# Patient Record
Sex: FEMALE | Race: WHITE
[De-identification: names, ages, dates, MRNs, and addresses within clinical notes are randomized per-mention and may not be internally consistent; named-entity substitution may affect disease eponyms.]

---

## 2019-11-18 ENCOUNTER — HOSPITAL ENCOUNTER (EMERGENCY)
Dept: HOSPITAL 97 - ER | Age: 34
Discharge: HOME | End: 2019-11-18
Payer: COMMERCIAL

## 2019-11-18 VITALS — OXYGEN SATURATION: 96 % | DIASTOLIC BLOOD PRESSURE: 73 MMHG | SYSTOLIC BLOOD PRESSURE: 124 MMHG

## 2019-11-18 VITALS — TEMPERATURE: 99.1 F

## 2019-11-18 DIAGNOSIS — N30.00: Primary | ICD-10-CM

## 2019-11-18 LAB
ALBUMIN SERPL BCP-MCNC: 3.8 G/DL (ref 3.4–5)
ALP SERPL-CCNC: 81 U/L (ref 45–117)
ALT SERPL W P-5'-P-CCNC: 18 U/L (ref 12–78)
AST SERPL W P-5'-P-CCNC: 9 U/L (ref 15–37)
BUN BLD-MCNC: 16 MG/DL (ref 7–18)
GIANT PLATELETS BLD QL SMEAR: (no result)
GLUCOSE SERPLBLD-MCNC: 100 MG/DL (ref 74–106)
HCT VFR BLD CALC: 41.8 % (ref 36–45)
LIPASE SERPL-CCNC: 67 U/L (ref 73–393)
LYMPHOCYTES # SPEC AUTO: 0.4 K/UL (ref 0.7–4.9)
MORPHOLOGY BLD-IMP: (no result)
PMV BLD: 10.3 FL (ref 7.6–11.3)
POTASSIUM SERPL-SCNC: 3.8 MMOL/L (ref 3.5–5.1)
RBC # BLD: 4.68 M/UL (ref 3.86–4.86)
UA COMPLETE W REFLEX CULTURE PNL UR: (no result)

## 2019-11-18 PROCEDURE — 83690 ASSAY OF LIPASE: CPT

## 2019-11-18 PROCEDURE — 81015 MICROSCOPIC EXAM OF URINE: CPT

## 2019-11-18 PROCEDURE — 85025 COMPLETE CBC W/AUTO DIFF WBC: CPT

## 2019-11-18 PROCEDURE — 81025 URINE PREGNANCY TEST: CPT

## 2019-11-18 PROCEDURE — 80053 COMPREHEN METABOLIC PANEL: CPT

## 2019-11-18 PROCEDURE — 76705 ECHO EXAM OF ABDOMEN: CPT

## 2019-11-18 PROCEDURE — 81003 URINALYSIS AUTO W/O SCOPE: CPT

## 2019-11-18 PROCEDURE — 36415 COLL VENOUS BLD VENIPUNCTURE: CPT

## 2019-11-18 PROCEDURE — 99284 EMERGENCY DEPT VISIT MOD MDM: CPT

## 2019-11-18 PROCEDURE — 87086 URINE CULTURE/COLONY COUNT: CPT

## 2019-11-18 PROCEDURE — 87088 URINE BACTERIA CULTURE: CPT

## 2019-11-18 NOTE — EDPHYS
Physician Documentation                                                                           

 [facility]                                                                 

Name: Elisha Aranda                                                                               

Age: 34 yrs                                                                                       

Sex: Female                                                                                       

: 1985                                                                                   

MRN: [id number]                                                                                   

Arrival Date: 2019                                                                          

Time: 10:33                                                                                       

Account#: [id number]                                                                            

Bed 8                                                                                             

Private MD:                                                                                       

ED Physician Jarret Marcelo                                                                      

HPI:                                                                                              

                                                                                             

11:07 This 34 yrs old  Female presents to ER via Ambulatory with complaints of       ps1 

      Abdominal Pain.                                                                             

11:07 Onset was this morning and associated with nausea and vomiting. Had loose stools but    ps1 

      does not attest to diarrhea. States her pain is localized to epigastrium and right          

      upper quadrant. Never had pain like this before. Does not associate with food. No           

      fever. Hx of abdominal surgery in past () remotely. Tolerating PO. .               

                                                                                                  

OB/GYN:                                                                                           

10:44 LMP N/A - Birth control method                                                          iw  

                                                                                                  

Historical:                                                                                       

- Allergies:                                                                                      

10:43 No Known Allergies;                                                                     tw2 

- Home Meds:                                                                                      

10:44 fluoxetine 10 mg Oral tab once daily [Active];                                          iw  

- PMHx:                                                                                           

10:43 None;                                                                                   tw2 

- PSHx:                                                                                           

10:43 ;                                                                              tw2 

                                                                                                  

- Immunization history:: Adult Immunizations.                                                     

- Social history:: Smoking status: .                                                              

- Ebola Screening: : Patient denies travel to an Ebola-affected area in the 21 days               

  before illness onset.                                                                           

                                                                                                  

                                                                                                  

ROS:                                                                                              

11:07 Constitutional: Negative for fever, chills, and weight loss, Eyes: Negative for injury, ps1 

      pain, redness, and discharge, Cardiovascular: Negative for chest pain, palpitations,        

      and edema, Respiratory: Negative for shortness of breath, cough, wheezing, and              

      pleuritic chest pain, MS/Extremity: Negative for injury and deformity, Skin: Negative       

      for injury, rash, and discoloration, Neuro: Negative for headache, weakness, numbness,      

      tingling, and seizure.                                                                      

11:07 Abdomen/GI: Positive for abdominal pain, nausea.                                            

                                                                                                  

Exam:                                                                                             

11:07 Constitutional:  This is a well developed, well nourished patient who is awake, alert,  ps1 

      and in no acute distress. Head/Face:  Normocephalic, atraumatic. Eyes:  Pupils equal        

      round and reactive to light, extra-ocular motions intact.  Lids and lashes normal.          

      Conjunctiva and sclera are non-icteric and not injected. Chest/axilla:  Normal chest        

      wall appearance and motion.  Nontender with no deformity.  No lesions are appreciated.      

      Cardiovascular:  Regular rate and rhythm.  No gallops, murmurs, or rubs.  Normal PMI,       

      no JVD.  No pulse deficits. Respiratory:  Lungs have equal breath sounds bilaterally,       

      clear to auscultation and percussion.  No rales, rhonchi or wheezes noted.  No              

      increased work of breathing, no retractions or nasal flaring. MS/ Extremity:  Pulses        

      equal, no cyanosis.  Neurovascular intact.  Full, normal range of motion. Neuro:  Awake     

      and alert, GCS 15, oriented to person, place, time, and situation.  Cranial nerves          

      II-XII grossly intact. Sensory grossly intact. Psych:  Awake, alert, with orientation       

      to person, place and time.  Behavior, mood, and affect are within normal limits.            

11:07 Abdomen/GI: Inspection: abdomen appears normal, Bowel sounds: normal, Palpation: soft,      

      mild abdominal tenderness, in the epigastric area and right upper quadrant, Indicators:     

      McBurney's point is not tender, Kent's sign is negative, Rovsing's sign is negative.      

                                                                                                  

Vital Signs:                                                                                      

10:44  / 85; Pulse 88; Resp 16; Temp 99.1(O); Pulse Ox 98% on R/A; Weight 83.01 kg;     iw  

      Height 5 ft. 2 in. (157.48 cm); Pain 6/10;                                                  

12:22  / 73; Pulse 88; Resp 17; Pulse Ox 96% on R/A;                                    tw2 

10:44 Body Mass Index 33.47 (83.01 kg, 157.48 cm)                                             iw  

                                                                                                  

MDM:                                                                                              

11:05 Patient medically screened.                                                             ps1 

12:22 Differential diagnosis: appendicitis, bowel obstruction, cholecystitis, Cholelithiasis, ps1 

      gastritis, urinary tract infection. Data reviewed: vital signs, nurses notes, lab test      

      result(s), radiologic studies, ultrasound. Counseling: I had a detailed discussion with     

      the patient and/or guardian regarding: the historical points, exam findings, and any        

      diagnostic results supporting the discharge/admit diagnosis, lab results, radiology         

      results, the need for outpatient follow up, to return to the emergency department if        

      symptoms worsen or persist or if there are any questions or concerns that arise at home.    

                                                                                                  

                                                                                             

10:58 Order name: Urine Dipstick--Ancillary (enter results); Complete Time: 11:31             bd  

                                                                                             

10:58 Order name: Urine Pregnancy--Ancillary (enter results); Complete Time: 11:31            bd  

                                                                                             

11:06 Order name: CBC with Diff                                                               ps1 

                                                                                             

12:21 Interpretation: Abnormal: DAVE% 91.0.                                                    ps1 

                                                                                             

11:06 Order name: CMP; Complete Time: 11:58                                                   ps1 

                                                                                             

11:06 Order name: Lipase; Complete Time: 11:58                                                [facility] 

                                                                                             

11:31 Order name: Urine Microscopic Only; Complete Time: 12:20                                ps1 

                                                                                             

12:20 Interpretation: Abnormal: UWBC 10-20; UBACT 20-50.                                      ps1 

                                                                                             

10:53 Order name: Urine Dipstick-Ancillary (obtain specimen); Complete Time: 10:53            tw2 

                                                                                             

10:54 Order name: Urine Pregnancy Test (obtain specimen); Complete Time: 10:54                tw2 

                                                                                             

11:06 Order name: US Abdomen Limited; Complete Time: 12:20                                    [facility] 

                                                                                             

11:11 Order name: IV Start; Complete Time: 11:30                                              tw2 

                                                                                             

12:03 Order name: Urine Culture                                                               EDMS

                                                                                                  

Administered Medications:                                                                         

No medications were administered                                                                  

                                                                                                  

                                                                                                  

Disposition:                                                                                      

19 12:24 Discharged to Home. Impression: Acute cystitis without hematuria, Unspecified      

  abdominal pain.                                                                                 

- Condition is Stable.                                                                            

- Discharge Instructions: Abdominal Pain, Adult, Urinary Tract Infection, Adult.                  

- Prescriptions for Keflex 500 mg Oral Capsule - take 1 capsule by ORAL route every 8             

  hours for 5 days; 15 capsule. Zofran 4 mg Oral Tablet - take 1 tablet by ORAL route             

  every 12 hours As needed; 20 tablet.                                                            

- Work release form, Family Work Release, Medication Reconciliation Form, Thank You               

  Letter, Antibiotic Education, Prescription Opioid Use form.                                     

- Follow up: Private Physician; When: As needed; Reason: Further diagnostic work-up,              

  Recheck today's complaints, Continuance of care, Re-evaluation by your physician.               

  Follow up: Emergency Department; When: As needed; Reason: Fever > 102 F, Worsening of           

  condition.                                                                                      

- Problem is new.                                                                                 

- Symptoms have improved.                                                                         

                                                                                                  

                                                                                                  

                                                                                                  

Signatures:                                                                                       

Dispatcher [employer]                           EDMS                                                 

Virginia Aguirre RN                     RN   iw                                                   

Rebekah Green RN                          RN   tw2                                                  

Jarret Marcelo MD MD   ps1                                                  

                                                                                                  

Corrections: (The following items were deleted from the chart)                                    

10:44 10:43 Home Meds: None; tw2                                                              iw  

12:20 12:20 Abnormal: UWBC 10-20. ps1                                                         ps1 

12:33 12:24 2019 12:24 Discharged to Home. Impression: Acute cystitis without           tw2 

      hematuria; Unspecified abdominal pain. Condition is Stable. Forms are Work release          

      form, Family Work Release, Medication Reconciliation Form, Thank You Letter, Antibiotic     

      Education, Prescription Opioid Use. Follow up: Private Physician; When: As needed;          

      Reason: Further diagnostic work-up, Recheck today's complaints, Continuance of care,        

      Re-evaluation by your physician. Follow up: Emergency Department; When: As needed;          

      Reason: Fever > 102 F, Worsening of condition. Problem is new. Symptoms have improved.      

      ps1                                                                                         

                                                                                                  

**************************************************************************************************

## 2019-11-18 NOTE — RAD REPORT
EXAM DESCRIPTION:  US - Abdomen Exam Limited - 11/18/2019 11:57 am

 

CLINICAL HISTORY:  RUQ pain

 

COMPARISON:  No comparisons

 

FINDINGS:  The gallbladder demonstrates no gallstones. No pericholecystic fluid or gallbladder wall t
hickening. The common bile duct is normal measuring 3 mm.

 

The liver demonstrates no findings of intrahepatic biliary dilatation.

 

IMPRESSION:  Unremarkable examination. negative...

## 2019-11-18 NOTE — ER
Nurse's Notes                                                                                     

 [facility]                                                                 

Name: Elisha Aranda                                                                               

Age: 34 yrs                                                                                       

Sex: Female                                                                                       

: 1985                                                                                   

MRN: [id number]                                                                                   

Arrival Date: 2019                                                                          

Time: 10:33                                                                                       

Account#: [id number]                                                                            

Bed 8                                                                                             

Private MD:                                                                                       

Diagnosis: Acute cystitis without hematuria;Unspecified abdominal pain                            

                                                                                                  

Presentation:                                                                                     

                                                                                             

10:37 Risk Assessment: Do you want to hurt yourself or someone else? Patient reports no       tw2 

      desire to harm self or others. Care prior to arrival: None.                                 

10:42 Transition of care: patient was not received from another setting of care. Onset of     tw2 

      symptoms was 2019. Initial Sepsis Screen: Does the patient meet any 2          

      criteria? No. Patient's initial sepsis screen is negative. Does the patient have a          

      suspected source of infection? No. Patient's initial sepsis screen is negative.             

10:42 Method Of Arrival: Ambulatory                                                           tw2 

10:43 Presenting complaint: Patient states: upper abd pain, nausea, loose stool started this  iw  

      morning at 0400, no vomiting.                                                               

10:43 Acuity: BOBBY 3                                                                           iw  

                                                                                                  

Triage Assessment:                                                                                

10:42 General: Appears in no apparent distress. Behavior is calm, cooperative, appropriate    tw2 

      for age. Pain: Complains of pain in abdomen. GI: Reports lower abdominal pain, upper        

      abdominal pain.                                                                             

                                                                                                  

OB/GYN:                                                                                           

10:44 LMP N/A - Birth control method                                                          iw  

                                                                                                  

Historical:                                                                                       

- Allergies:                                                                                      

10:43 No Known Allergies;                                                                     tw2 

- Home Meds:                                                                                      

10:44 fluoxetine 10 mg Oral tab once daily [Active];                                          iw  

- PMHx:                                                                                           

10:43 None;                                                                                   tw2 

- PSHx:                                                                                           

10:43 ;                                                                              tw2 

                                                                                                  

- Immunization history:: Adult Immunizations.                                                     

- Social history:: Smoking status: .                                                              

- Ebola Screening: : Patient denies travel to an Ebola-affected area in the 21 days               

  before illness onset.                                                                           

                                                                                                  

                                                                                                  

Screening:                                                                                        

10:36 Abuse screen: Denies threats or abuse. Nutritional screening: No deficits noted.        tw2 

      Tuberculosis screening: No symptoms or risk factors identified. Fall Risk None              

      identified.                                                                                 

                                                                                                  

Assessment:                                                                                       

10:45 General: Appears in no apparent distress. Behavior is calm, cooperative, appropriate    tw2 

      for age. Pain: Complains of pain in abdomen. Neuro: Level of Consciousness is awake,        

      alert, obeys commands, Oriented to person, place, time, situation. Cardiovascular:          

      Heart tones S1 S2 Patient's skin is warm and dry. Respiratory: Airway is patent             

      Respiratory effort is even, unlabored, Respiratory pattern is regular, symmetrical,         

      Breath sounds are clear bilaterally. GI: Abdomen is round Bowel sounds present X 4          

      quads. Abd is soft X 4 quads Reports lower abdominal pain, upper abdominal pain,            

      diarrhea, nausea. : No signs and/or symptoms were reported regarding the                  

      genitourinary system. EENT: No signs and/or symptoms were reported regarding the EENT       

      system. Derm: No signs and/or symptoms reported regarding the dermatologic system.          

      Musculoskeletal: Range of motion: intact in all extremities.                                

12:21 Reassessment: Patient appears in no apparent distress at this time. No changes from     tw2 

      previously documented assessment. Patient and/or family updated on plan of care and         

      expected duration. Pain level reassessed. Patient is alert, oriented x 3, equal             

      unlabored respirations, skin warm/dry/pink.                                                 

                                                                                                  

Vital Signs:                                                                                      

10:44  / 85; Pulse 88; Resp 16; Temp 99.1(O); Pulse Ox 98% on R/A; Weight 83.01 kg;     iw  

      Height 5 ft. 2 in. (157.48 cm); Pain 6/10;                                                  

12:22  / 73; Pulse 88; Resp 17; Pulse Ox 96% on R/A;                                    tw2 

10:44 Body Mass Index 33.47 (83.01 kg, 157.48 cm)                                             iw  

                                                                                                  

ED Course:                                                                                        

10:33 Patient arrived in ED.                                                                  mr  

10:34 Jarret Marcelo MD is Attending Physician.                                             ps1 

10:36 Rebekah Green, RN is Primary Nurse.                                                        tw2 

10:37 Bed in low position. Call light in reach.                                               tw2 

10:37 Arm band placed on.                                                                     tw2 

10:43 Triage completed.                                                                       iw  

11:30 Initial lab(s) drawn, by me, sent to lab. Inserted saline lock: 18 gauge in right       bb  

      antecubital area, using aseptic technique. Blood collected.                                 

11:58 US Abdomen Limited In Process Unspecified.                                              EDMS

12:32 No provider procedures requiring assistance completed. IV discontinued, intact,         tw2 

      bleeding controlled, No redness/swelling at site. Pressure dressing applied.                

                                                                                                  

Administered Medications:                                                                         

No medications were administered                                                                  

                                                                                                  

                                                                                                  

Outcome:                                                                                          

12:24 Discharge ordered by MD.                                                                ps1 

12:33 Discharged to home ambulatory, with significant other.                                  tw2 

12:33 Condition: stable                                                                           

12:33 Discharge instructions given to patient, significant other, Instructed on discharge         

      instructions, follow up and referral plans. medication usage, Demonstrated                  

      understanding of instructions, follow-up care, medications, Prescriptions given X 2.        

12:33 Patient left the ED.                                                                    tw2 

                                                                                                  

Signatures:                                                                                       

Dispatcher [employer]                           [facility]                                                 

Laura DuenasShasha, RN                     [facility]   bb                                                   

Virginia Aguirre RN RN   iw                                                   

Rebekah Green RN RN   tw2                                                  

Jarret Marcelo MD MD   ps1                                                  

                                                                                                  

Corrections: (The following items were deleted from the chart)                                    

10:44 10:43 Home Meds: None; tw2                                                              iw  

                                                                                                  

**************************************************************************************************